# Patient Record
Sex: MALE | Race: WHITE | NOT HISPANIC OR LATINO | Employment: UNEMPLOYED | URBAN - METROPOLITAN AREA
[De-identification: names, ages, dates, MRNs, and addresses within clinical notes are randomized per-mention and may not be internally consistent; named-entity substitution may affect disease eponyms.]

---

## 2018-02-01 ENCOUNTER — CONVERSION ENCOUNTER (OUTPATIENT)
Dept: FAMILY MEDICINE CLINIC | Facility: CLINIC | Age: 38
End: 2018-02-01

## 2018-02-01 ENCOUNTER — OFFICE VISIT CONVERTED (OUTPATIENT)
Dept: FAMILY MEDICINE CLINIC | Facility: CLINIC | Age: 38
End: 2018-02-01
Attending: FAMILY MEDICINE

## 2018-02-19 ENCOUNTER — OFFICE VISIT CONVERTED (OUTPATIENT)
Dept: FAMILY MEDICINE CLINIC | Facility: CLINIC | Age: 38
End: 2018-02-19
Attending: FAMILY MEDICINE

## 2018-02-19 ENCOUNTER — CONVERSION ENCOUNTER (OUTPATIENT)
Dept: FAMILY MEDICINE CLINIC | Facility: CLINIC | Age: 38
End: 2018-02-19

## 2018-04-02 ENCOUNTER — OFFICE VISIT CONVERTED (OUTPATIENT)
Dept: FAMILY MEDICINE CLINIC | Facility: CLINIC | Age: 38
End: 2018-04-02
Attending: FAMILY MEDICINE

## 2018-04-02 ENCOUNTER — CONVERSION ENCOUNTER (OUTPATIENT)
Dept: FAMILY MEDICINE CLINIC | Facility: CLINIC | Age: 38
End: 2018-04-02

## 2018-06-04 ENCOUNTER — CONVERSION ENCOUNTER (OUTPATIENT)
Dept: FAMILY MEDICINE CLINIC | Facility: CLINIC | Age: 38
End: 2018-06-04

## 2018-06-04 ENCOUNTER — OFFICE VISIT CONVERTED (OUTPATIENT)
Dept: FAMILY MEDICINE CLINIC | Facility: CLINIC | Age: 38
End: 2018-06-04
Attending: FAMILY MEDICINE

## 2018-08-06 ENCOUNTER — OFFICE VISIT CONVERTED (OUTPATIENT)
Dept: FAMILY MEDICINE CLINIC | Facility: CLINIC | Age: 38
End: 2018-08-06
Attending: FAMILY MEDICINE

## 2018-08-06 ENCOUNTER — CONVERSION ENCOUNTER (OUTPATIENT)
Dept: FAMILY MEDICINE CLINIC | Facility: CLINIC | Age: 38
End: 2018-08-06

## 2018-10-08 ENCOUNTER — OFFICE VISIT CONVERTED (OUTPATIENT)
Dept: FAMILY MEDICINE CLINIC | Facility: CLINIC | Age: 38
End: 2018-10-08
Attending: FAMILY MEDICINE

## 2018-10-08 ENCOUNTER — CONVERSION ENCOUNTER (OUTPATIENT)
Dept: FAMILY MEDICINE CLINIC | Facility: CLINIC | Age: 38
End: 2018-10-08

## 2018-12-20 ENCOUNTER — CONVERSION ENCOUNTER (OUTPATIENT)
Dept: FAMILY MEDICINE CLINIC | Facility: CLINIC | Age: 38
End: 2018-12-20

## 2018-12-20 ENCOUNTER — OFFICE VISIT CONVERTED (OUTPATIENT)
Dept: FAMILY MEDICINE CLINIC | Facility: CLINIC | Age: 38
End: 2018-12-20
Attending: FAMILY MEDICINE

## 2019-03-19 ENCOUNTER — CONVERSION ENCOUNTER (OUTPATIENT)
Dept: FAMILY MEDICINE CLINIC | Facility: CLINIC | Age: 39
End: 2019-03-19

## 2019-03-19 ENCOUNTER — OFFICE VISIT CONVERTED (OUTPATIENT)
Dept: FAMILY MEDICINE CLINIC | Facility: CLINIC | Age: 39
End: 2019-03-19
Attending: FAMILY MEDICINE

## 2019-04-11 ENCOUNTER — CONVERSION ENCOUNTER (OUTPATIENT)
Dept: FAMILY MEDICINE CLINIC | Facility: CLINIC | Age: 39
End: 2019-04-11

## 2019-04-11 ENCOUNTER — OFFICE VISIT CONVERTED (OUTPATIENT)
Dept: FAMILY MEDICINE CLINIC | Facility: CLINIC | Age: 39
End: 2019-04-11
Attending: FAMILY MEDICINE

## 2019-04-25 ENCOUNTER — HOSPITAL ENCOUNTER (OUTPATIENT)
Dept: URGENT CARE | Facility: CLINIC | Age: 39
Discharge: HOME | End: 2019-04-25

## 2019-09-19 ENCOUNTER — OFFICE VISIT CONVERTED (OUTPATIENT)
Dept: FAMILY MEDICINE CLINIC | Facility: CLINIC | Age: 39
End: 2019-09-19
Attending: FAMILY MEDICINE

## 2020-03-19 ENCOUNTER — OFFICE VISIT CONVERTED (OUTPATIENT)
Dept: FAMILY MEDICINE CLINIC | Facility: CLINIC | Age: 40
End: 2020-03-19
Attending: FAMILY MEDICINE

## 2020-06-24 ENCOUNTER — OFFICE VISIT CONVERTED (OUTPATIENT)
Dept: FAMILY MEDICINE CLINIC | Facility: CLINIC | Age: 40
End: 2020-06-24
Attending: FAMILY MEDICINE

## 2020-06-24 ENCOUNTER — CONVERSION ENCOUNTER (OUTPATIENT)
Dept: FAMILY MEDICINE CLINIC | Facility: CLINIC | Age: 40
End: 2020-06-24

## 2020-10-16 ENCOUNTER — HOSPITAL ENCOUNTER (OUTPATIENT)
Dept: OTHER | Facility: HOSPITAL | Age: 40
Discharge: HOME | End: 2020-10-16
Attending: NURSE PRACTITIONER

## 2020-10-17 LAB — SARS-COV-2 RNA SPEC QL NAA+PROBE: DETECTED

## 2021-05-13 NOTE — PROGRESS NOTES
Progress Note      Patient Name: Dave Mercado   Patient ID: [id number]   Sex: Male   YOB: 1980    Primary Care Provider: Shayne Cuenca III MD   Referring Provider: Shayne Cuenca III MD    Visit Date: June 24, 2020    Provider: Shayne Cuenca III MD   Location: [facility]   Location Address: [address] [place] [address]  [id number]   Location Phone: [phone]          Chief Complaint  · 3 month follow depression and anxiety      History Of Present Illness  Dave Mercado is a 40 year old /White male who presents for evaluation and treatment of:      HPI     patient is a 40-year-old white male history of generalized anxiety and depression, he is on aripiprazole 5 mg daily and venlafaxine 75 mg that he is getting through [facility].  patient is working regularly and 8-hour days and doing reasonably well with that.  He has some more legal troubles and is dealing with that.  He is exercising, encourage this and he is going to start again his counseling.    ROS    cardiovascular no chest pain no palpitations  respiratory no shortness of breath, dyspnea exertion  psych patient is encouraged to keep his counseling up every week would be better than every other as this is scheduled needs his exercise.  And to continue his medicines    Physical exam     blood pressure 112/60, weight is 144 pound weight gain, temperature 98   General patient appears somewhat anxious.    Cardiovascular regular rhythm murmur   respiratory no increased work of breathing no rales rhonchi or wheezes.    Psych patient appears to be clear and lucid in his thoughts,  not appear particularly depressed or anxious.    Assessment     generalized anxiety and depression, situational    Plan     continue counseling, exercise, medications and recheck in 4 months       Past Medical History  Disease Name Date Onset Notes   ***No Significant Medical History --  --    Anxiety --  --    Chronic recurrent  major depressive disorder 08/06/2018 --    Desires Vasectomy --  --    History of vasectomy 03/19/2019 --    Major Depression (Recurrent) --  --    Medication management 08/06/2018 --    Nicotine dependence 03/19/2020 --    Stress 03/19/2019 --    Stress and adjustment reaction 03/19/2020 --    Suicidal ideation 03/19/2020 --    Suicide attempt by inadequate means 03/19/2020 --          Past Surgical History  Procedure Name Date Notes   Vasectomy 8-7-15 --    Vasectomy --  --          Medication List  Name Date Started Instructions   aripiprazole 5 mg oral tablet  take 1 tablet (5 mg) by oral route once daily   venlafaxine 75 mg oral capsule,extended release 24hr  take 1 capsule (75 mg) by oral route once daily         Allergy List  Allergen Name Date Reaction Notes   NO KNOWN DRUG ALLERGIES --  --  --        Allergies Reconciled  Family Medical History  Disease Name Relative/Age Notes   Adopted - no noted history  --          Social History  Finding Status Start/Stop Quantity Notes   Active but no formal exercise --  --/-- --  --    Advance directive declined by patient --  --/-- --  --    Alcohol Current some day 0/0 --  drinks rarely   Caffeine Current every day 0/0 --  drinks regularly; soft drinks; 1-2 times per day   Has no physical impairment --  --/-- --  --    Lives with --  --/-- --  02/01/2018 - currently lives with his parents     --  --/-- --  --    Second hand smoke exposure Current some day 0/0 --  yes   Smoking --  --/-- --  --    Technician --  --/-- --     Tobacco Current every day 20/0 pack a week  06/24/2020 - states smokes 1/4 pk per day 04/02/2018 - Patient states smoking 1ppd now 02/19/2018 - 02/01/2018 - pt states a pack last him 1 week current some days smoker; started smoking at age 22; smoked 4 cigarette(s) per day         Immunizations  NameDate Admin Mfg Trade Name Lot Number Route Inj VIS Given VIS Publication   Hepatitis A09/23/2019 SKB HAVRIX-ADULT [id number] IM RD  "09/23/2019    Comments: NDC [id number]   Hepatitis A03/22/2019 MSD VAQTA-ADULT [id number] IM RD 03/22/2019 10/25/2011   Comments: ndc 3995-2013-34   Tentsbehe74/08/2018 PMC Fluzone Quadrivalent [id number] IM RD 10/08/2018 08/07/2015   Comments: NDC [id number]   Wwuuzsuqs45/30/2017 SKB Fluarix, quadrivalent, preservative free [id number] NE NE 02/01/2018 08/15/2015   Comments:          Vitals  Date Time BP Position Site L\R Cuff Size HR RR TEMP (F) WT  HT  BMI kg/m2 BSA m2 O2 Sat HC       06/24/2020 12:47 /60 Sitting      98 140lbs 0oz 5'  7\" 21.93 1.73               Results  · In-Office Procedures  o Lab procedure  § IOP - Urine Drug Screen In-House [facility] (73747)   § Amphetamines Ur Ql: Negative   § Barbiturates Ur Ql: Negative   § Buprenorphine+Nor Ur Ql Scn: Negative   § Benzodiaz Ur Ql: Negative   § Cocaine Ur Ql: Negative   § Methadone Ur Ql: Negative   § Methamphet Ur Ql: Negative   § MDMA Ur Ql Scn: Negative   § Opiates Ur Ql: Negative   § Oxycodone Ur Ql: Negative   § PCP Ur Ql: Negative   § THC Ur Ql: Negative   § Temp in Range?: Within/Acceptable   § Control Seen?: Yes       Assessment  · Nicotine dependence     305.1/F17.200  · Chronic recurrent major depressive disorder     296.30/F33.9  · Medication management     V58.69/Z79.899  · Stress and adjustment reaction     309.89/F43.29  · Anxiety     300.00/F41.9    Problems Reconciled  Plan  · Orders  o ACO-17: Screened for tobacco use AND received tobacco cessation intervention (4004F) - 305.1/F17.200 - 06/24/2020  o ACO-39: Current medications updated and reviewed () - - 06/24/2020  · Medications  o Medications have been Reconciled  o Transition of Care or Provider Policy  · Instructions  o *Form of nicotine being used: cigarettes  o Patient was strongly encouraged to discontinue use of any nicotine containing product or minimize the use of the product.  o Patient is taking medications as prescribed and doing well.   o Take all medications as " prescribed/directed.  o Patient instructed/educated on their diet and exercise program.  o Patient was educated/instructed on their diagnosis, treatment and medications prior to discharge from the clinic today.  o Bring all medicines with their bottles to each office visit.  o Time spent with the patient was 10 minutes, more than 50% face to face.  o Chronic conditions reviewed and taken into consideration for today's treatment plan.  o Discussed Covid-19 precautions including, but not limited to, social distancing, avoid touching your face, and hand washing.             Electronically Signed by: Kalyani Goff, -Author on June 24, 2020 02:11:20 PM  Electronically Co-signed by: Shayne Cuecna III MD -Reviewer on June 24, 2020 05:42:59 PM

## 2021-05-15 VITALS
SYSTOLIC BLOOD PRESSURE: 112 MMHG | HEIGHT: 67 IN | DIASTOLIC BLOOD PRESSURE: 60 MMHG | BODY MASS INDEX: 21.97 KG/M2 | WEIGHT: 140 LBS | TEMPERATURE: 98 F

## 2021-05-15 VITALS — SYSTOLIC BLOOD PRESSURE: 100 MMHG | HEART RATE: 75 BPM | DIASTOLIC BLOOD PRESSURE: 58 MMHG | TEMPERATURE: 97.4 F

## 2021-05-15 VITALS
BODY MASS INDEX: 21.35 KG/M2 | HEIGHT: 67 IN | SYSTOLIC BLOOD PRESSURE: 120 MMHG | DIASTOLIC BLOOD PRESSURE: 70 MMHG | WEIGHT: 136 LBS

## 2021-05-15 VITALS
WEIGHT: 126 LBS | HEIGHT: 67 IN | DIASTOLIC BLOOD PRESSURE: 60 MMHG | BODY MASS INDEX: 19.78 KG/M2 | SYSTOLIC BLOOD PRESSURE: 110 MMHG

## 2021-05-16 VITALS
SYSTOLIC BLOOD PRESSURE: 92 MMHG | DIASTOLIC BLOOD PRESSURE: 58 MMHG | HEART RATE: 68 BPM | HEIGHT: 67 IN | WEIGHT: 136 LBS | BODY MASS INDEX: 21.35 KG/M2 | RESPIRATION RATE: 16 BRPM

## 2021-05-16 VITALS
SYSTOLIC BLOOD PRESSURE: 96 MMHG | HEIGHT: 67 IN | BODY MASS INDEX: 18.83 KG/M2 | WEIGHT: 120 LBS | DIASTOLIC BLOOD PRESSURE: 60 MMHG

## 2021-05-16 VITALS
WEIGHT: 125 LBS | BODY MASS INDEX: 19.62 KG/M2 | SYSTOLIC BLOOD PRESSURE: 102 MMHG | HEIGHT: 67 IN | DIASTOLIC BLOOD PRESSURE: 60 MMHG

## 2021-05-16 VITALS
BODY MASS INDEX: 19.15 KG/M2 | SYSTOLIC BLOOD PRESSURE: 98 MMHG | DIASTOLIC BLOOD PRESSURE: 64 MMHG | HEIGHT: 67 IN | WEIGHT: 122 LBS

## 2021-05-16 VITALS — DIASTOLIC BLOOD PRESSURE: 88 MMHG | SYSTOLIC BLOOD PRESSURE: 138 MMHG | WEIGHT: 144 LBS

## 2021-05-16 VITALS — WEIGHT: 144 LBS | DIASTOLIC BLOOD PRESSURE: 70 MMHG | SYSTOLIC BLOOD PRESSURE: 110 MMHG

## 2021-05-16 VITALS — HEART RATE: 60 BPM

## 2021-05-19 ENCOUNTER — HOSPITAL ENCOUNTER (OUTPATIENT)
Dept: VACCINE CLINIC | Facility: HOSPITAL | Age: 41
Discharge: HOME | End: 2021-05-19
Attending: INTERNAL MEDICINE

## 2021-06-28 RX ORDER — VENLAFAXINE HYDROCHLORIDE 75 MG/1
CAPSULE, EXTENDED RELEASE ORAL
Qty: 30 CAPSULE | Refills: 1 | Status: SHIPPED | OUTPATIENT
Start: 2021-06-28 | End: 2021-07-07 | Stop reason: SDUPTHER

## 2021-06-28 RX ORDER — ARIPIPRAZOLE 5 MG/1
TABLET ORAL
Qty: 30 TABLET | Refills: 0 | Status: SHIPPED | OUTPATIENT
Start: 2021-06-28 | End: 2021-07-07 | Stop reason: SDUPTHER

## 2021-07-06 ENCOUNTER — TELEPHONE (OUTPATIENT)
Dept: PEDIATRICS | Facility: OTHER | Age: 41
End: 2021-07-06

## 2021-07-06 NOTE — TELEPHONE ENCOUNTER
Caller: Judith - [facility]    Relationship to patient: Other    Best call back number:    Patient is needing: Judith from [facility] called and stated that they received appt reminder for Dave. He is incarcerated and will not be able to make it to the appt - she suggested that we cancel the appt, but there is no BH verbal so I could not cancel it -     she is unsure if the medication can be refilled w/o an appt due to the circumstances of incarceration  but she will have him communicate w/ his family to coordinate whether medications can be refilled w/o seeing provider or if they need to provide them thru the retirement center at cost to the patient.

## 2021-07-07 RX ORDER — ARIPIPRAZOLE 5 MG/1
5 TABLET ORAL DAILY
Qty: 90 TABLET | Refills: 3 | Status: SHIPPED | OUTPATIENT
Start: 2021-07-07 | End: 2022-09-19 | Stop reason: SDUPTHER

## 2021-07-07 RX ORDER — VENLAFAXINE HYDROCHLORIDE 75 MG/1
75 CAPSULE, EXTENDED RELEASE ORAL DAILY
Qty: 90 CAPSULE | Refills: 3 | Status: SHIPPED | OUTPATIENT
Start: 2021-07-07 | End: 2022-09-19 | Stop reason: SDUPTHER

## 2021-07-07 NOTE — TELEPHONE ENCOUNTER
Aripiprazole 5 mg Abilify and venlafaxine Exar 75mg daily or sent in 90 of both and 3 refills to the pharmacy

## 2022-09-19 ENCOUNTER — OFFICE VISIT (OUTPATIENT)
Dept: FAMILY MEDICINE CLINIC | Facility: CLINIC | Age: 42
End: 2022-09-19

## 2022-09-19 VITALS
DIASTOLIC BLOOD PRESSURE: 70 MMHG | SYSTOLIC BLOOD PRESSURE: 114 MMHG | WEIGHT: 160 LBS | BODY MASS INDEX: 25.11 KG/M2 | HEIGHT: 67 IN

## 2022-09-19 DIAGNOSIS — F33.0 MAJOR DEPRESSIVE DISORDER, RECURRENT, MILD: Primary | ICD-10-CM

## 2022-09-19 PROCEDURE — 99213 OFFICE O/P EST LOW 20 MIN: CPT | Performed by: FAMILY MEDICINE

## 2022-09-19 RX ORDER — ARIPIPRAZOLE 5 MG/1
5 TABLET ORAL DAILY
Qty: 90 TABLET | Refills: 3 | Status: SHIPPED | OUTPATIENT
Start: 2022-09-19

## 2022-09-19 RX ORDER — VENLAFAXINE HYDROCHLORIDE 75 MG/1
75 CAPSULE, EXTENDED RELEASE ORAL DAILY
Qty: 90 CAPSULE | Refills: 3 | Status: SHIPPED | OUTPATIENT
Start: 2022-09-19

## 2022-09-19 NOTE — PROGRESS NOTES
"Chief Complaint  Depression    SUBJECTIVE  Dave Sweat presents to [facility]    Patient is 42 years old just released from [employer] after 2 years doing well taking Abilify 5 mg and Effexor 75 discussed exercise discussed continue counseling discussed not placing himself in any compromising situations and will start decreasing the medicines in 6 months if he is doing well    PAST MEDICAL HISTORY  No Known Allergies     Past Surgical History:   • VASECTOMY       Social History     Tobacco Use   • Smoking status: Current Every Day Smoker   • Smokeless tobacco: Not on file   • Tobacco comment: pack a week  6/24/20 - states smokes 1/4 PPD 04/02/18 - pt states smoking 1 PPD now 02/19/2018-02/01/18 pt states a pack last him 1 week current some days smoker; started smoking at age 22; smoked 4 cigarettes per day   Substance Use Topics   • Alcohol use: Yes     Comment: Current some day  drinks rarely       Family History   Adopted: Yes   Family history unknown: Yes        Health Maintenance Due   Topic Date Due   • ANNUAL PHYSICAL  Never done   • Pneumococcal Vaccine 0-64 (1 - PCV) Never done   • TDAP/TD VACCINES (2 - Tdap) 04/25/2007   • HEPATITIS C SCREENING  Never done        Last Completed Colonoscopy     This patient has no relevant Health Maintenance data.          REVIEW OF SYSTEMS    Psych patient is doing well enjoying things as is seeing his  on a regular basis has been out for about a week    OBJECTIVE  Vitals:    09/19/22 1125   BP: 114/70   Weight: 72.6 kg (160 lb)   Height: 170.2 cm (67\")     Body mass index is 25.06 kg/m².    PHYSICAL EXAM    Enteral no distress  Psych does not seem either anxious or depressed  Cardiovascular regular rhythm no murmur  Lungs clear and equal bilaterally    ASSESSMENT & PLAN  There are no diagnoses linked to this encounter.      History of depression anxiety seems to be doing well may try to decrease the medicine in 6months recheck " in 6 months suggest counseling weekly Dr. Todd or Ms. Calloway            Patient was given instructions and counseling regarding his condition or for health maintenance advice. Please see specific information pulled into the AVS if appropriate.

## 2023-03-20 ENCOUNTER — OFFICE VISIT (OUTPATIENT)
Dept: FAMILY MEDICINE CLINIC | Facility: CLINIC | Age: 43
End: 2023-03-20
Payer: MEDICAID

## 2023-03-20 DIAGNOSIS — F33.0 MAJOR DEPRESSIVE DISORDER, RECURRENT, MILD: Primary | ICD-10-CM

## 2023-03-20 PROCEDURE — 99213 OFFICE O/P EST LOW 20 MIN: CPT | Performed by: FAMILY MEDICINE

## 2023-09-18 ENCOUNTER — OFFICE VISIT (OUTPATIENT)
Dept: FAMILY MEDICINE CLINIC | Facility: CLINIC | Age: 43
End: 2023-09-18

## 2023-09-18 VITALS
OXYGEN SATURATION: 98 % | WEIGHT: 180 LBS | BODY MASS INDEX: 28.25 KG/M2 | DIASTOLIC BLOOD PRESSURE: 72 MMHG | HEIGHT: 67 IN | HEART RATE: 94 BPM | SYSTOLIC BLOOD PRESSURE: 116 MMHG | TEMPERATURE: 98 F

## 2023-09-18 DIAGNOSIS — F33.0 MAJOR DEPRESSIVE DISORDER, RECURRENT, MILD: Primary | ICD-10-CM

## 2023-09-18 PROCEDURE — 99213 OFFICE O/P EST LOW 20 MIN: CPT | Performed by: FAMILY MEDICINE

## 2023-09-18 RX ORDER — VENLAFAXINE HYDROCHLORIDE 75 MG/1
75 CAPSULE, EXTENDED RELEASE ORAL DAILY
Qty: 90 CAPSULE | Refills: 3 | Status: SHIPPED | OUTPATIENT
Start: 2023-09-18

## 2023-09-18 RX ORDER — ARIPIPRAZOLE 5 MG/1
5 TABLET ORAL DAILY
Qty: 90 TABLET | Refills: 3 | Status: SHIPPED | OUTPATIENT
Start: 2023-09-18

## 2023-09-18 NOTE — PROGRESS NOTES
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 and needs to get more exercise gained about 20 pounds    PAST MEDICAL HISTORY  No Known Allergies     Abilify and venlafaxine      Social History     Tobacco Use    Smoking status: Every Day     Types: Cigarettes    Smokeless tobacco: Not on file    Tobacco comments:     pack a week  6/24/20 - states smokes 1/4 PPD 04/02/18 - pt states smoking 1 PPD now 02/19/2018-02/01/18 pt states a pack last him 1 week current some days smoker; started smoking at age 22; smoked 4 cigarettes per day   Substance Use Topics    Alcohol use: Yes     Comment: Current some day  drinks rarely       Family History   Adopted: Yes   Family history unknown: Yes        Health Maintenance Due   Topic Date Due    BMI FOLLOWUP  Never done    Pneumococcal Vaccine 0-64 (1 - PCV) Never done    TDAP/TD VACCINES (2 - Tdap) 04/25/2007    HEPATITIS C SCREENING  Never done    ANNUAL PHYSICAL  Never done    COVID-19 Vaccine (3 - Booster for Chioma series) 03/25/2022        Last Completed Colonoscopy       This patient has no relevant Health Maintenance data.            REVIEW OF SYSTEMS    Cardiovascular no chest pain no palpitations encouraged at least 30 minutes of exercise a day  Respiratory no shortness of breath or dyspnea exertion  GI patient is due to start his colon screens at 47 Cologuard would be a good start is a plant-based diet does not need to gain any more weight    OBJECTIVE  Vitals:    09/18/23 1011   BP: 116/72   Pulse: 94   Temp: 98 °F (36.7 °C)   SpO2: 98%   Weight: 81.6 kg (180 lb)   Height: 170.2 cm (67\")     Body mass index is 28.19 kg/m².    PHYSICAL EXAM    General no distress  Cardiovascular regular rhythm no murmur  Respiratory lungs " clear and equal bilaterally  Abdomen soft nontender no paraspinal megaly  Psych does not seem either anxious or depressed    ASSESSMENT & PLAN  There are no diagnoses linked to this encounter.      Depression well treated with venlafaxine and Abilify we will continue increase exercise did not gain any more weight            Patient was given instructions and counseling regarding his condition or for health maintenance advice. Please see specific information pulled into the AVS if appropriate.